# Patient Record
Sex: FEMALE | Race: WHITE | Employment: UNEMPLOYED | URBAN - METROPOLITAN AREA
[De-identification: names, ages, dates, MRNs, and addresses within clinical notes are randomized per-mention and may not be internally consistent; named-entity substitution may affect disease eponyms.]

---

## 2021-09-24 ENCOUNTER — HOSPITAL ENCOUNTER (OUTPATIENT)
Age: 41
Discharge: HOME | End: 2021-09-26
Payer: COMMERCIAL

## 2021-09-24 ENCOUNTER — HOSPITAL ENCOUNTER (OUTPATIENT)
Dept: MAMMOGRAPHY | Age: 41
Discharge: HOME | End: 2021-09-26
Payer: COMMERCIAL

## 2021-09-24 DIAGNOSIS — Z12.31 ENCOUNTER FOR SCREENING MAMMOGRAM FOR MALIGNANT NEOPLASM OF BREAST: ICD-10-CM

## 2021-09-24 PROCEDURE — 77063 BREAST TOMOSYNTHESIS BI: CPT

## 2023-09-23 ENCOUNTER — HOSPITAL ENCOUNTER (INPATIENT)
Age: 43
LOS: 3 days | Discharge: HOME | DRG: 885 | End: 2023-09-26
Attending: PSYCHIATRY & NEUROLOGY | Admitting: PSYCHIATRY & NEUROLOGY
Payer: COMMERCIAL

## 2023-09-23 ENCOUNTER — HOSPITAL ENCOUNTER (EMERGENCY)
Age: 43
Discharge: PSYCHIATRIC OR SUBSTANCE USE FACILITY | End: 2023-09-23
Attending: EMERGENCY MEDICINE
Payer: COMMERCIAL

## 2023-09-23 VITALS
WEIGHT: 203 LBS | SYSTOLIC BLOOD PRESSURE: 142 MMHG | OXYGEN SATURATION: 99 % | RESPIRATION RATE: 15 BRPM | HEART RATE: 78 BPM | HEIGHT: 64 IN | DIASTOLIC BLOOD PRESSURE: 77 MMHG | BODY MASS INDEX: 34.66 KG/M2 | TEMPERATURE: 99 F

## 2023-09-23 DIAGNOSIS — F31.81 MIXED BIPOLAR II DISORDER (HCC): Primary | ICD-10-CM

## 2023-09-23 DIAGNOSIS — R45.851 SUICIDAL IDEATION: ICD-10-CM

## 2023-09-23 DIAGNOSIS — F41.0 ANXIETY ATTACK: Primary | ICD-10-CM

## 2023-09-23 PROBLEM — F32.A DEPRESSION WITH SUICIDAL IDEATION: Status: ACTIVE | Noted: 2023-09-23

## 2023-09-23 LAB
ALBUMIN SERPL-MCNC: 4.6 G/DL (ref 3.5–5.2)
ALP SERPL-CCNC: 64 U/L (ref 35–104)
ALT SERPL-CCNC: 10 U/L (ref 0–32)
AMPHET UR QL SCN: NEGATIVE
ANION GAP SERPL CALCULATED.3IONS-SCNC: 15 MMOL/L (ref 7–16)
APAP SERPL-MCNC: <5 UG/ML (ref 10–30)
AST SERPL-CCNC: 11 U/L (ref 0–31)
BACTERIA URNS QL MICRO: ABNORMAL
BARBITURATES UR QL SCN: NEGATIVE
BASOPHILS # BLD: 0.06 K/UL (ref 0–0.2)
BASOPHILS NFR BLD: 1 % (ref 0–2)
BENZODIAZ UR QL: NEGATIVE
BILIRUB SERPL-MCNC: 2.1 MG/DL (ref 0–1.2)
BILIRUB UR QL STRIP: ABNORMAL
BUN SERPL-MCNC: 19 MG/DL (ref 6–20)
CALCIUM SERPL-MCNC: 9.7 MG/DL (ref 8.6–10.2)
CANNABINOIDS UR QL SCN: POSITIVE
CHLORIDE SERPL-SCNC: 102 MMOL/L (ref 98–107)
CLARITY UR: CLEAR
CO2 SERPL-SCNC: 20 MMOL/L (ref 22–29)
COCAINE UR QL SCN: NEGATIVE
COLOR UR: YELLOW
CREAT SERPL-MCNC: 0.7 MG/DL (ref 0.5–1)
EKG ATRIAL RATE: 63 BPM
EKG P AXIS: 32 DEGREES
EKG P-R INTERVAL: 162 MS
EKG Q-T INTERVAL: 410 MS
EKG QRS DURATION: 76 MS
EKG QTC CALCULATION (BAZETT): 419 MS
EKG R AXIS: -4 DEGREES
EKG T AXIS: 12 DEGREES
EKG VENTRICULAR RATE: 63 BPM
EOSINOPHIL # BLD: 0.07 K/UL (ref 0.05–0.5)
EOSINOPHILS RELATIVE PERCENT: 1 % (ref 0–6)
ERYTHROCYTE [DISTWIDTH] IN BLOOD BY AUTOMATED COUNT: 12.6 % (ref 11.5–15)
ETHANOLAMINE SERPL-MCNC: <10 MG/DL
FENTANYL UR QL: NEGATIVE
GFR SERPL CREATININE-BSD FRML MDRD: >60 ML/MIN/1.73M2
GLUCOSE SERPL-MCNC: 88 MG/DL (ref 74–99)
GLUCOSE UR STRIP-MCNC: NEGATIVE MG/DL
HCG, URINE, POC: NEGATIVE
HCT VFR BLD AUTO: 47.9 % (ref 34–48)
HGB BLD-MCNC: 16.3 G/DL (ref 11.5–15.5)
HGB UR QL STRIP.AUTO: NEGATIVE
IMM GRANULOCYTES # BLD AUTO: 0.06 K/UL (ref 0–0.58)
IMM GRANULOCYTES NFR BLD: 1 % (ref 0–5)
KETONES UR STRIP-MCNC: >80 MG/DL
LEUKOCYTE ESTERASE UR QL STRIP: NEGATIVE
LYMPHOCYTES NFR BLD: 1.19 K/UL (ref 1.5–4)
LYMPHOCYTES RELATIVE PERCENT: 12 % (ref 20–42)
Lab: NORMAL
MCH RBC QN AUTO: 30.7 PG (ref 26–35)
MCHC RBC AUTO-ENTMCNC: 34 G/DL (ref 32–34.5)
MCV RBC AUTO: 90.2 FL (ref 80–99.9)
METHADONE UR QL: NEGATIVE
MONOCYTES NFR BLD: 0.37 K/UL (ref 0.1–0.95)
MONOCYTES NFR BLD: 4 % (ref 2–12)
NEGATIVE QC PASS/FAIL: NORMAL
NEUTROPHILS NFR BLD: 83 % (ref 43–80)
NEUTS SEG NFR BLD: 8.31 K/UL (ref 1.8–7.3)
NITRITE UR QL STRIP: NEGATIVE
OPIATES UR QL SCN: NEGATIVE
OXYCODONE UR QL SCN: NEGATIVE
PCP UR QL SCN: NEGATIVE
PH UR STRIP: 6 [PH] (ref 5–9)
PLATELET # BLD AUTO: 297 K/UL (ref 130–450)
PMV BLD AUTO: 10.3 FL (ref 7–12)
POSITIVE QC PASS/FAIL: NORMAL
POTASSIUM SERPL-SCNC: 3.9 MMOL/L (ref 3.5–5)
PROT SERPL-MCNC: 7.5 G/DL (ref 6.4–8.3)
PROT UR STRIP-MCNC: NEGATIVE MG/DL
RBC # BLD AUTO: 5.31 M/UL (ref 3.5–5.5)
RBC #/AREA URNS HPF: ABNORMAL /HPF
SALICYLATES SERPL-MCNC: <0.3 MG/DL (ref 0–30)
SODIUM SERPL-SCNC: 137 MMOL/L (ref 132–146)
SP GR UR STRIP: >1.03 (ref 1–1.03)
TEST INFORMATION: ABNORMAL
TOXIC TRICYCLIC SC,BLOOD: NEGATIVE
TSH SERPL DL<=0.05 MIU/L-ACNC: 0.67 UIU/ML (ref 0.27–4.2)
UROBILINOGEN UR STRIP-ACNC: 0.2 EU/DL (ref 0–1)
WBC #/AREA URNS HPF: ABNORMAL /HPF
WBC OTHER # BLD: 10.1 K/UL (ref 4.5–11.5)

## 2023-09-23 PROCEDURE — 85025 COMPLETE CBC W/AUTO DIFF WBC: CPT

## 2023-09-23 PROCEDURE — 80143 DRUG ASSAY ACETAMINOPHEN: CPT

## 2023-09-23 PROCEDURE — 80053 COMPREHEN METABOLIC PANEL: CPT

## 2023-09-23 PROCEDURE — G0480 DRUG TEST DEF 1-7 CLASSES: HCPCS

## 2023-09-23 PROCEDURE — 6360000002 HC RX W HCPCS: Performed by: EMERGENCY MEDICINE

## 2023-09-23 PROCEDURE — 6370000000 HC RX 637 (ALT 250 FOR IP): Performed by: PSYCHIATRY & NEUROLOGY

## 2023-09-23 PROCEDURE — 96374 THER/PROPH/DIAG INJ IV PUSH: CPT

## 2023-09-23 PROCEDURE — 84443 ASSAY THYROID STIM HORMONE: CPT

## 2023-09-23 PROCEDURE — 93010 ELECTROCARDIOGRAM REPORT: CPT | Performed by: INTERNAL MEDICINE

## 2023-09-23 PROCEDURE — 99285 EMERGENCY DEPT VISIT HI MDM: CPT

## 2023-09-23 PROCEDURE — 1240000000 HC EMOTIONAL WELLNESS R&B

## 2023-09-23 PROCEDURE — 80307 DRUG TEST PRSMV CHEM ANLYZR: CPT

## 2023-09-23 PROCEDURE — 80179 DRUG ASSAY SALICYLATE: CPT

## 2023-09-23 PROCEDURE — 93005 ELECTROCARDIOGRAM TRACING: CPT | Performed by: EMERGENCY MEDICINE

## 2023-09-23 PROCEDURE — 81001 URINALYSIS AUTO W/SCOPE: CPT

## 2023-09-23 RX ORDER — HALOPERIDOL 5 MG/ML
5 INJECTION INTRAMUSCULAR EVERY 6 HOURS PRN
Status: DISCONTINUED | OUTPATIENT
Start: 2023-09-23 | End: 2023-09-26 | Stop reason: HOSPADM

## 2023-09-23 RX ORDER — HALOPERIDOL 5 MG/1
5 TABLET ORAL EVERY 6 HOURS PRN
Status: DISCONTINUED | OUTPATIENT
Start: 2023-09-23 | End: 2023-09-26 | Stop reason: HOSPADM

## 2023-09-23 RX ORDER — LORAZEPAM 2 MG/ML
0.5 INJECTION INTRAMUSCULAR ONCE
Status: COMPLETED | OUTPATIENT
Start: 2023-09-23 | End: 2023-09-23

## 2023-09-23 RX ORDER — NICOTINE 21 MG/24HR
1 PATCH, TRANSDERMAL 24 HOURS TRANSDERMAL DAILY
Status: DISCONTINUED | OUTPATIENT
Start: 2023-09-24 | End: 2023-09-26 | Stop reason: HOSPADM

## 2023-09-23 RX ORDER — HYDROXYZINE PAMOATE 50 MG/1
50 CAPSULE ORAL 3 TIMES DAILY PRN
Status: DISCONTINUED | OUTPATIENT
Start: 2023-09-23 | End: 2023-09-26 | Stop reason: HOSPADM

## 2023-09-23 RX ORDER — LANOLIN ALCOHOL/MO/W.PET/CERES
3 CREAM (GRAM) TOPICAL NIGHTLY PRN
Status: DISCONTINUED | OUTPATIENT
Start: 2023-09-23 | End: 2023-09-26 | Stop reason: HOSPADM

## 2023-09-23 RX ORDER — MAGNESIUM HYDROXIDE/ALUMINUM HYDROXICE/SIMETHICONE 120; 1200; 1200 MG/30ML; MG/30ML; MG/30ML
30 SUSPENSION ORAL PRN
Status: DISCONTINUED | OUTPATIENT
Start: 2023-09-23 | End: 2023-09-26 | Stop reason: HOSPADM

## 2023-09-23 RX ORDER — ACETAMINOPHEN 325 MG/1
650 TABLET ORAL EVERY 6 HOURS PRN
Status: DISCONTINUED | OUTPATIENT
Start: 2023-09-23 | End: 2023-09-26 | Stop reason: HOSPADM

## 2023-09-23 RX ADMIN — MELATONIN 3 MG ORAL TABLET 3 MG: 3 TABLET ORAL at 23:58

## 2023-09-23 RX ADMIN — LORAZEPAM 0.5 MG: 2 INJECTION INTRAMUSCULAR; INTRAVENOUS at 12:18

## 2023-09-23 RX ADMIN — HYDROXYZINE PAMOATE 50 MG: 50 CAPSULE ORAL at 23:58

## 2023-09-23 NOTE — ED NOTES
Patient crying upon arrival to room. Patient is sobbing and difficult to understand. Spouse at bedside.       Ricardo Carrillo RN  09/23/23 1730

## 2023-09-23 NOTE — ED NOTES
Patient troyevelyn SI/HI at this time. States she feels \"sad\" and has had these feeling all her life. Cooperative with care.      Tomy Baeza, STEPHON  09/23/23 7727

## 2023-09-23 NOTE — ED NOTES
Patient pink slipped by attending resident @ [phone] on 9/23/2023. Pink slip and tele consent faxed to [phone]Frankie Bauer RN  09/23/23 3210

## 2023-09-23 NOTE — ED NOTES
Patient had process explained of medical clearance and psychological evaluation. Patient becoming much calmer and able to carry on a conversation.        Octavio Richardson RN  09/23/23 1924

## 2023-09-24 PROBLEM — F32.A DEPRESSION WITH SUICIDAL IDEATION: Status: RESOLVED | Noted: 2023-09-23 | Resolved: 2023-09-24

## 2023-09-24 PROBLEM — F31.81 MIXED BIPOLAR II DISORDER (HCC): Status: ACTIVE | Noted: 2023-09-24

## 2023-09-24 PROBLEM — R45.851 DEPRESSION WITH SUICIDAL IDEATION: Status: RESOLVED | Noted: 2023-09-23 | Resolved: 2023-09-24

## 2023-09-24 PROCEDURE — 90792 PSYCH DIAG EVAL W/MED SRVCS: CPT | Performed by: NURSE PRACTITIONER

## 2023-09-24 PROCEDURE — 1240000000 HC EMOTIONAL WELLNESS R&B

## 2023-09-24 PROCEDURE — 6370000000 HC RX 637 (ALT 250 FOR IP): Performed by: NURSE PRACTITIONER

## 2023-09-24 PROCEDURE — 6370000000 HC RX 637 (ALT 250 FOR IP): Performed by: PSYCHIATRY & NEUROLOGY

## 2023-09-24 RX ORDER — OLANZAPINE 5 MG/1
5 TABLET ORAL NIGHTLY
Status: DISCONTINUED | OUTPATIENT
Start: 2023-09-24 | End: 2023-09-26 | Stop reason: HOSPADM

## 2023-09-24 RX ORDER — DIVALPROEX SODIUM 250 MG/1
250 TABLET, DELAYED RELEASE ORAL EVERY 12 HOURS SCHEDULED
Status: DISCONTINUED | OUTPATIENT
Start: 2023-09-24 | End: 2023-09-26 | Stop reason: HOSPADM

## 2023-09-24 RX ADMIN — DIVALPROEX SODIUM 250 MG: 250 TABLET, DELAYED RELEASE ORAL at 22:19

## 2023-09-24 RX ADMIN — OLANZAPINE 5 MG: 5 TABLET, FILM COATED ORAL at 22:20

## 2023-09-24 RX ADMIN — HYDROXYZINE PAMOATE 50 MG: 50 CAPSULE ORAL at 22:19

## 2023-09-24 RX ADMIN — MELATONIN 3 MG ORAL TABLET 3 MG: 3 TABLET ORAL at 22:20

## 2023-09-24 ASSESSMENT — PATIENT HEALTH QUESTIONNAIRE - PHQ9
SUM OF ALL RESPONSES TO PHQ QUESTIONS 1-9: 2
2. FEELING DOWN, DEPRESSED OR HOPELESS: 1
SUM OF ALL RESPONSES TO PHQ QUESTIONS 1-9: 2
SUM OF ALL RESPONSES TO PHQ9 QUESTIONS 1 & 2: 2
SUM OF ALL RESPONSES TO PHQ QUESTIONS 1-9: 2
2. FEELING DOWN, DEPRESSED OR HOPELESS: 1
SUM OF ALL RESPONSES TO PHQ9 QUESTIONS 1 & 2: 2
SUM OF ALL RESPONSES TO PHQ QUESTIONS 1-9: 2
1. LITTLE INTEREST OR PLEASURE IN DOING THINGS: 1
1. LITTLE INTEREST OR PLEASURE IN DOING THINGS: 1

## 2023-09-24 ASSESSMENT — SLEEP AND FATIGUE QUESTIONNAIRES
DO YOU HAVE DIFFICULTY SLEEPING: YES
SLEEP PATTERN: DIFFICULTY FALLING ASLEEP
DO YOU USE A SLEEP AID: NO
DO YOU HAVE DIFFICULTY SLEEPING: YES
AVERAGE NUMBER OF SLEEP HOURS: 7
AVERAGE NUMBER OF SLEEP HOURS: 7
SLEEP PATTERN: DIFFICULTY FALLING ASLEEP
DO YOU USE A SLEEP AID: NO

## 2023-09-24 ASSESSMENT — LIFESTYLE VARIABLES
HOW MANY STANDARD DRINKS CONTAINING ALCOHOL DO YOU HAVE ON A TYPICAL DAY: PATIENT DOES NOT DRINK
HOW OFTEN DO YOU HAVE A DRINK CONTAINING ALCOHOL: NEVER
HOW MANY STANDARD DRINKS CONTAINING ALCOHOL DO YOU HAVE ON A TYPICAL DAY: PATIENT DOES NOT DRINK
HOW OFTEN DO YOU HAVE A DRINK CONTAINING ALCOHOL: NEVER

## 2023-09-24 NOTE — GROUP NOTE
Group Therapy Note    Date: 9/24/2023    Group Start Time: 1115  Group End Time: 0738  Group Topic: Psychoeducation    [facility] 2    Alycia Delgado                                                                        Group Therapy Note    Date: 9/24/2023  Start Time: 1115  End Time:  6113  Number of Participants: 9    Type of Group: Psychoeducation    Wellness Binder Information  Module Name:  5 ways to well being    Patient's Goal:  Increase knowledge of different dimensions of wellness. To ID one or two ways to improve well being in one of the domains    Notes:  CTRS discussed five different dimensions of well being and ways to improve well being. Patient accepting of handout and exhibited a receptive behavior to information provided. Status After Intervention:  Improved    Participation Level:  Active Listener and Interactive    Participation Quality: Appropriate, Attentive, and Sharing      Speech:  normal      Thought Process/Content: Logical      Affective Functioning: Congruent      Mood: euthymic      Level of consciousness:  Alert and Attentive      Response to Learning: Able to verbalize current knowledge/experience and Able to verbalize/acknowledge new learning      Endings: None Reported    Modes of Intervention: Education and Exploration      Discipline Responsible: Psychoeducational Specialist      Signature:  Alycia Delgado
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
## 2023-09-24 NOTE — ED NOTES
[facility] #[id number]   [facility]# [phone]     Daren [facility]  09/23/23 205

## 2023-09-24 NOTE — CONSENT
Pt accepted to Dr Salma Orourke service per [facility] RN, Jeananne Nyhan in admitting notified of assigned bed [address] aware of pending admission, Zachary Grace in [place] aware of pt's admission status. PLEASE REMEMBER TO SEND ORIGINAL PINK SLIP. Report can be called to [phone].

## 2023-09-24 NOTE — H&P
Department of Psychiatry  History and Physical - Adult       Patient personally seen and examined by me mental status examination performed. I agree the below assessment by the medical student. Psychomotor evaluation revealed no agitation retardation any abnormal movements. Her eye contact is fair her speech is n rapid pressured she is hyperverbal.  Her mood is \"I am doing okay today. \"  Affect is appropriate pleasant. Thought process is tangential.  Thought contents with devoid of any auditory or visual hallucinations delusions or other perceptual normalities. she denies suicidal homicidal ideations intent or plan. She is able to repeat words and phrases, her vocabulary is intact she has knowledge of current events and past events recent remote memory are intact her impulse control is adequate her cognitive function was to be at her baseline her insight judgment is fair she is alert oriented time place and person              CHIEF COMPLAINT:      \" I had a bad panic attack\"    CIRCUMSTANCES OF ADMISSION:   Patient was pink slipped in the ED for suicidal ideation   HISTORY OF PRESENT ILLNESS:      The patient is a 37 y.o. female,  and with 1 son, with a past psychiatric history of panic attack, with no past psychiatric hospitalization. Patient came to the ED voluntarily but was pink slipped due to suicidal ideation. Urine drug screen was positive for cannabis and QTC was 419 ms. Patient was medically cleared and admitted to [facility] [address] adult psychiatric unit for further psychiatric evaluation and stabilization. Upon evaluation today, patient states that she was having worsening panic attacks where she sought treatment with the PCP when it first started. She tried zoloft for months but had sexual side effects which she does not like. She tried bupropion for which she states it did not work either.  She denies any depressive mood, anhedonia, appetite changes, activity changes, feeling of worthlessness but states

## 2023-09-24 NOTE — PLAN OF CARE
Denies SI/HI   denies hallucinations  out on the unit and social with select peers   affect is bright and smiling  states this her first psych admit because she had a panic attack  attending group  will continue to monitor

## 2023-09-24 NOTE — PROGRESS NOTES
[facility]  Admission Note     Admission Type:   Admission Type: Involuntary    Reason for admission:  Reason for Admission: \"My anxiety has been bad for three days and I was having a panic attack, I couldn't stop crying. \"      Addictive Behavior:   Addictive Behavior  In the Past 3 Months, Have You Felt or Has Someone Told You That You Have a Problem With  : None    Medical Problems:   No past medical history on file. Status EXAM:  Mental Status and Behavioral Exam  Normal: No  Level of Assistance: Independent/Self  Facial Expression: Worried, Sad  Affect: Congruent  Level of Consciousness: Alert  Frequency of Checks: 4 times per hour, close  Mood:Normal: No  Mood: Anxious, Depressed, Sad  Motor Activity:Normal: Yes  Eye Contact: Good  Observed Behavior: Cooperative, Tearful  Sexual Misconduct History: Current - no  Preception: Kramer to person, Kramer to time, Kramer to place, Kramer to situation  Attention:Normal: No  Attention: Distractible  Thought Processes: Tangential  Thought Content:Normal: Yes  Depression Symptoms: Feelings of helplessness, Feelings of worthlessness  Anxiety Symptoms: Generalized  Vero Symptoms: No problems reported or observed.   Hallucinations: None  Delusions: No  Memory:Normal: Yes  Insight and Judgment: No  Insight and Judgment: Poor insight    Tobacco Screening:  Practical Counseling, on admission, agustin X, if applicable and completed (first 3 are required if patient doesn't refuse):            ( ) Recognizing danger situations (included triggers and roadblocks)                    ( ) Coping skills (new ways to manage stress,relaxation techniques, changing routine, distraction)                                                           ( ) Basic information about quitting (benefits of quitting, techniques in how to quit, available resources  ( ) Referral for counseling faxed to [phone]

## 2023-09-24 NOTE — PROGRESS NOTES
Patient attended community meeting   Was updated on expectations of the unit, staffing, and programming  Patient shared goal for today as \"to not be so anxious\"

## 2023-09-24 NOTE — ED NOTES
Collateral Note:  Pt is a 36 yo female who presents to the ED as a walk in accompanied by her spouse, pt is pink slipped by the ED physician. Pt presented reporting anxiety and she has been crying for 3 days. ED doctors note states pt has hx of Zoloft which she stooped taking as she did not like the way it made her feel, also started on Bupropion but reported it did not really address her symptoms. Currently reporting significant stress in her home life caring for an autistic son. Pt reports daily thoughts of harming herself but states she has never done anything about them due to her [employer] beliefs and her children. Per pink slip pt did make statements to ED doc that she doesn't want to be a burden to anyone anymore. Per pink slip pt spouse stated to physician that he is very concerned about pt harming herself. Pt has hx of being prescribed Zoloft and bupropion by her PCP but these were not successful for her. No hx of previous admission to psych unit noted. Pt not open for counseling or psychiatric care in the community. It is noted that tox screen positive for cannabis.         Referred to [facility] nurse Weaver for review with on-call re: possible admission [place], [place]  09/23/23 [address] [place], [place]  09/23/23 2031

## 2023-09-24 NOTE — PROGRESS NOTES
4 Eyes Skin Assessment     NAME:  Katelyn Drew  YOB: 1980  MEDICAL RECORD NUMBER:  [id number]    The patient is being assessed for  Admission    I agree that at least one RN has performed a thorough Head to Toe Skin Assessment on the patient. ALL assessment sites listed below have been assessed. Areas assessed by both nurses:    Head, Face, Ears, Shoulders, Back, Chest, Arms, Elbows, Hands, Sacrum. Buttock, Coccyx, Ischium, and Legs. Feet and Heels        Does the Patient have a Wound?  No noted wound(s)       Rex Prevention initiated by RN: No  Wound Care Orders initiated by RN: No    Pressure Injury (Stage 3,4, Unstageable, DTI, NWPT, and Complex wounds) if present, place Wound referral order by RN under : No    New Ostomies, if present place, Ostomy referral order under : No     Nurse 1 eSignature: Electronically signed by Antionette Mendenhall RN on 9/24/23 at 1:36 AM EDT    **SHARE this note so that the co-signing nurse can place an eSignature**    Nurse 2 eSignature: {Esignature:[id number]}

## 2023-09-24 NOTE — PROGRESS NOTES
Patient attended afternoon recreation activity, watching [employer]  Patient calm and cooperative and social with peers. Patient was 1 of 8 in attendance.

## 2023-09-24 NOTE — CARE COORDINATION
Collateral Contact (KETTY signed)  Name: Jonny Miranda  Relationship:   Number: [phone]     Collateral Information: SW called pt's  Felicia Sánchez to obtain collateral information and discuss discharge planning. SW left a voicemail requesting a call back.                   Access to Weapons per Collateral Contact: [] Reports [] Denies

## 2023-09-24 NOTE — PLAN OF CARE
[facility]  Initial Interdisciplinary Treatment Plan NOTE    Review Date & Time: 9/24/23 1500    Patient was in treatment team    Admission Type:   Admission Type: Involuntary    Reason for admission:  Reason for Admission: \"My anxiety has been bad for three days and I was having a panic attack, I couldn't stop crying. \"      Estimated Length of Stay Update:  3-5 days  Estimated Discharge Date Update: 5-7 days    EDUCATION:   Learner Progress Toward Treatment Goals: Reviewed results and recommendations of this team    Method: Group    Outcome: Verbalized understanding    PATIENT GOALS: \"to be not so anxious\"    PLAN/TREATMENT RECOMMENDATIONS UPDATE:day 3    GOALS UPDATE:   Time frame for Short-Term Goals: 3-5 days    Oscar Sher RN

## 2023-09-24 NOTE — ED NOTES
Patient placed in paper gown and patient's clothing put into bags and put at the nursing station.      Samantha Sepulveda RN  09/23/23 2012

## 2023-09-24 NOTE — ED NOTES
Spoke to Dr. Clemente Zelaya who accepted patient to [address] [place] [address], diagnosis major depression with anxiety.  [address] notified.       Julia Dickey RN  09/23/23 2043

## 2023-09-25 PROCEDURE — 6370000000 HC RX 637 (ALT 250 FOR IP): Performed by: NURSE PRACTITIONER

## 2023-09-25 PROCEDURE — 99232 SBSQ HOSP IP/OBS MODERATE 35: CPT | Performed by: NURSE PRACTITIONER

## 2023-09-25 PROCEDURE — 6370000000 HC RX 637 (ALT 250 FOR IP): Performed by: PSYCHIATRY & NEUROLOGY

## 2023-09-25 PROCEDURE — 1240000000 HC EMOTIONAL WELLNESS R&B

## 2023-09-25 RX ADMIN — DIVALPROEX SODIUM 250 MG: 250 TABLET, DELAYED RELEASE ORAL at 21:20

## 2023-09-25 RX ADMIN — OLANZAPINE 5 MG: 5 TABLET, FILM COATED ORAL at 21:20

## 2023-09-25 RX ADMIN — HYDROXYZINE PAMOATE 50 MG: 50 CAPSULE ORAL at 13:12

## 2023-09-25 RX ADMIN — MELATONIN 3 MG ORAL TABLET 3 MG: 3 TABLET ORAL at 21:20

## 2023-09-25 RX ADMIN — DIVALPROEX SODIUM 250 MG: 250 TABLET, DELAYED RELEASE ORAL at 10:58

## 2023-09-25 ASSESSMENT — PAIN SCALES - GENERAL: PAINLEVEL_OUTOF10: 0

## 2023-09-25 NOTE — PROGRESS NOTES
Patient attended afternoon relaxation activity. Patients participated in activity of patients choice. Patients  we able to color, read and others choose to watch [employer]. Patient was 1 of 9 in attendance.

## 2023-09-25 NOTE — CARE COORDINATION
Collateral Contact (KETTY signed)  Name: Hector Shah  Relationship:   Number: [phone]     Collateral Information: SW called pt's  Ad Pickett to obtain collateral information and discuss discharge planning. SW spoke with Ad Nieves who stated that the pt was crying for 3 days and they were not sure what to do and she needed to be on medications so they brought her to the hospital.  stated that she came into the hospital to get back on medications. Ad Pickett stated that he wants the pt to feel better. Duong Apo stated that the pt has always had anxiety but he is unsure if hormones are making it worse because of menopause. Duong Apo stated that he came for visitation and they have talked on the phone and he has not seen any improvement and he can not accomplish a lot in 15 minutes.  stated that the pt is able to return back home and he will pick her up from the hospital.  stated that the pt does not drive and telehealth would be the best option.  stated that the pt does not have access to any guns/weapons.                  Access to Weapons per Collateral Contact: [] Reports [x] Denies

## 2023-09-25 NOTE — PROGRESS NOTES
Comprehensive Nutrition Assessment    Type and Reason for Visit:  Initial, Positive Nutrition Screen    Nutrition Recommendations/Plan:   Continue current diet regimen ; pt w/ mostly adequate oral intakes this adm- no further nutrition recs at this time  Will sign off- please consult if further recs needed. Malnutrition Assessment:  Malnutrition Status:  Insufficient data (09/25/23 1254)    Context:  Social/Environmental Circumstances     Findings of the 6 clinical characteristics of malnutrition:  Energy Intake:  Mild decrease in energy intake (Comment) (poor intake PTA; intake mostly adequate since admission- difficult to assess longterm intake)  Weight Loss:  Unable to assess (d/t lack of wt hx per EMR)     Body Fat Loss:  Unable to assess (attempted x 2 ; pt not in room)     Muscle Mass Loss:  Unable to assess (attempted x 2 ; pt not in room)    Fluid Accumulation:  No significant fluid accumulation     Strength:  Not Performed    Nutrition Assessment:    pt adm d/t panic attack/anxiety ; pt reports saddness ; tox screen positive for cannabis; No PMhx on file; DINORAH wt hx d/t lack of wt hx recorded per EMR ; pt w/ mostly % oral intakes at meals ; continue current diet regimen; no further nutrition recs at this time; dietitian will sign off at this time- please consult if further nutrition recs needed. Nutrition Related Findings:    DINORAH I/O ; alert ; cracked filling via dentition noted; abd WNL; no edema Wound Type: None       Current Nutrition Intake & Therapies:    Average Meal Intake: % (most)  Average Supplements Intake: Unable to assess  ADULT DIET;  Regular; Safety Tray; Safety Tray (Disposables)  ADULT ORAL NUTRITION SUPPLEMENT; Breakfast, Lunch, Dinner; Standard High Calorie/High Protein Oral Supplement    Anthropometric Measures:  Height: 5' 4\" (162.6 cm)  Ideal Body Weight (IBW): 120 lbs (55 kg)    Admission Body Weight: 191 lb 6.4 oz (86.8 kg) (9/24-SS)  Current Body Weight: 191 lb

## 2023-09-25 NOTE — BH NOTE
Patient resting in bed with eyes closed. Respirations even and unlabored. No signs of distress. Purposeful rounding continued.

## 2023-09-25 NOTE — PLAN OF CARE
Problem: Depression  Goal: Will be euthymic at discharge  Description: INTERVENTIONS:  1. Administer medication as ordered  2. Provide emotional support via 1:1 interaction with staff  3. Encourage involvement in milieu/groups/activities  4. Monitor for social isolation  9/25/2023 0006 by Yasmeen Laureano RN  Outcome: Progressing     Problem: Anxiety  Goal: Will report anxiety at manageable levels  Description: INTERVENTIONS:  1. Administer medication as ordered  2. Teach and rehearse alternative coping skills  3. Provide emotional support with 1:1 interaction with staff  Outcome: Progressing     Problem: Involuntary Admit  Goal: Will cooperate with staff recommendations and doctor's orders and will demonstrate appropriate behavior  Description: INTERVENTIONS:  1. Treat underlying conditions and offer medication as ordered  2. Educate regarding involuntary admission procedures and rules  3.  Contain excessive/inappropriate behavior per unit and hospital policies  Outcome: Progressing Biopsy Method: 15 blade

## 2023-09-25 NOTE — PROGRESS NOTES
Patient attended morning community meeting. Updated on staffing assignments and daily expectations. Shared goal for the day as to use this as a positive experience.

## 2023-09-25 NOTE — GROUP NOTE
Group Therapy Note    Date: 9/25/2023    Group Start Time: 1130  Group End Time: 1753  Group Topic: Psychoeducation    [facility] ACUTE [facility] [address] [place] [place]                                                                        Group Therapy Note    Date: 9/25/2023  Group Name: Boundaries   Patient's Goal:  Patient will be able to identify different types of boundaries and healthy ways to express them. Notes:  Pleasant and sharing in group, able to participate appropriately. Status After Intervention:  Improved    Participation Level:  Active Listener and Interactive    Participation Quality: Appropriate, Attentive, and Sharing      Speech:  normal      Thought Process/Content: Logical      Affective Functioning: Congruent      Mood: euthymic      Level of consciousness:  Alert, Oriented x4, and Attentive      Response to Learning: Able to verbalize/acknowledge new learning      Endings: None Reported    Modes of Intervention: Education, Support, Socialization, Exploration, and Problem-solving      Discipline Responsible: Psychoeducational Specialist      Signature:  Eleanor Castillo

## 2023-09-25 NOTE — PROGRESS NOTES
BEHAVIORAL HEALTH FOLLOW-UP NOTE     9/25/2023     Patient was seen and examined in person, Chart reviewed   Patient's case discussed with staff/team    Chief Complaint: \"I just started the medications yesterday, but I already feel better knowing that I am getting help\"     Interim History:     Patient out in the milieu, states that she is anxious because of the high energy and intensity of the unit. \"Its really loud out here\"  She states that she cannot tell if the medications are working or not because she just started them, but feels better knowing that she is getting help. She denies any SI/HI intent or plan. No overt or covert psychosis or dinora  Optimistic about treatment. Out in the [facility] attending groups. Taking medications. Reports she slept okay   Appetite:   [x] Normal/Unchanged  [] Increased  [] Decreased      Sleep:       [x] Normal/Unchanged  [] Fair       [] Poor              Energy:    [x] Normal/Unchanged  [] Increased  [] Decreased        SI [] Present  [x] Absent    HI  []Present  [x] Absent     Aggression:  [] yes  [x] no    Patient is [x] able  [] unable to CONTRACT FOR SAFETY     PAST MEDICAL/PSYCHIATRIC HISTORY:   No past medical history on file. FAMILY/SOCIAL HISTORY:  No family history on file.   Social History     Socioeconomic History    Marital status:      Spouse name: Not on file    Number of children: Not on file    Years of education: Not on file    Highest education level: Not on file   Occupational History    Not on file   Tobacco Use    Smoking status: Never    Smokeless tobacco: Never   Substance and Sexual Activity    Alcohol use: Not on file    Drug use: Not on file    Sexual activity: Not on file   Other Topics Concern    Not on file   Social History Narrative    Not on file     Social Determinants of Health     Financial Resource Strain: Not on file   Food Insecurity: Not on file   Transportation Needs: Not on file   Physical Activity: Not on file   Stress: Not on

## 2023-09-25 NOTE — GROUP NOTE
Group Therapy Note    Date: 9/25/2023    Group Start Time: 0125  Group End Time: 1122  Group Topic: Cognitive Skills    [facility] AC[facility]    Thankful GIRISH Hernandes LSW        Group Therapy Note    Attendees: 8       Patient's Goal:  Pt attended group therapy where we learned about cognitive distortions and the different types that effect our thinking. Notes:  Pt was an active participant in group therapy. Status After Intervention:  Improved    Participation Level: Active Listener and Interactive    Participation Quality: Appropriate, Attentive, and Sharing      Speech:  normal      Thought Process/Content: Logical      Affective Functioning: Congruent      Mood: euthymic      Level of consciousness:  Alert and Attentive      Response to Learning: Able to verbalize current knowledge/experience and Able to retain information      Endings: None Reported    Modes of Intervention: Education, Support, Socialization, Exploration, Clarifying, and Problem-solving      Discipline Responsible: /Counselor      Signature:   GIRISH Valenzuela, ODELL

## 2023-09-25 NOTE — BH NOTE
Patient denies suicidal ideation, homicidal ideations and AVH. Denies depression. Reports anxiety is 4/10 due to \"being in a new place with new people\". Patient is friendly and cooperative during assessment. Patient appears worried but brightens with conversation. Patient is out on the unit and is social with select. Medications taken without issue. No complaints or concerns verbalized at this time. No unit problems reported. Will continue to observe and support.

## 2023-09-25 NOTE — PLAN OF CARE
Denies SI/HI  denies hallucinations  out on the unit and social with select peers  cooperative with meds  attending group       complaint of some anxiety  will continue to monitor

## 2023-09-26 VITALS
TEMPERATURE: 98.2 F | RESPIRATION RATE: 16 BRPM | SYSTOLIC BLOOD PRESSURE: 127 MMHG | WEIGHT: 191.4 LBS | HEART RATE: 82 BPM | BODY MASS INDEX: 32.68 KG/M2 | OXYGEN SATURATION: 99 % | DIASTOLIC BLOOD PRESSURE: 80 MMHG | HEIGHT: 64 IN

## 2023-09-26 PROCEDURE — 6370000000 HC RX 637 (ALT 250 FOR IP): Performed by: NURSE PRACTITIONER

## 2023-09-26 PROCEDURE — 99239 HOSP IP/OBS DSCHRG MGMT >30: CPT | Performed by: NURSE PRACTITIONER

## 2023-09-26 PROCEDURE — 6370000000 HC RX 637 (ALT 250 FOR IP): Performed by: PSYCHIATRY & NEUROLOGY

## 2023-09-26 RX ORDER — OLANZAPINE 5 MG/1
5 TABLET ORAL NIGHTLY
Qty: 30 TABLET | Refills: 0 | Status: SHIPPED | OUTPATIENT
Start: 2023-09-26 | End: 2023-10-26

## 2023-09-26 RX ORDER — HYDROXYZINE PAMOATE 50 MG/1
50 CAPSULE ORAL 3 TIMES DAILY PRN
Qty: 42 CAPSULE | Refills: 0 | Status: SHIPPED | OUTPATIENT
Start: 2023-09-26 | End: 2023-10-10

## 2023-09-26 RX ORDER — LANOLIN ALCOHOL/MO/W.PET/CERES
3 CREAM (GRAM) TOPICAL NIGHTLY PRN
Refills: 3 | COMMUNITY
Start: 2023-09-26

## 2023-09-26 RX ORDER — DIVALPROEX SODIUM 250 MG/1
250 TABLET, DELAYED RELEASE ORAL EVERY 12 HOURS SCHEDULED
Qty: 60 TABLET | Refills: 0 | Status: SHIPPED | OUTPATIENT
Start: 2023-09-26 | End: 2023-10-26

## 2023-09-26 RX ADMIN — HYDROXYZINE PAMOATE 50 MG: 50 CAPSULE ORAL at 09:47

## 2023-09-26 RX ADMIN — DIVALPROEX SODIUM 250 MG: 250 TABLET, DELAYED RELEASE ORAL at 08:37

## 2023-09-26 ASSESSMENT — PAIN SCALES - GENERAL
PAINLEVEL_OUTOF10: 0
PAINLEVEL_OUTOF10: 0

## 2023-09-26 NOTE — DISCHARGE INSTRUCTIONS
Counseling   [address] [facility], [facility], [facility]   Phone: [phone]   Fax: [phone]     [facility]   [address], Ritesh Roberson   Phone: [phone]   Fax: [phone] 8585 Jessica Robb [address] [place] [address]   Phone: [phone]   fax: [address]   [address]   Phone: [phone]   Fax: [phone]     [address]   Phone: [phone]   Fax: [address]   [address] [facility] [address]   Phone: [phone]   Fax: [phone]     [facility]   [address]   Phone: [phone]   Fax: [phone]     General acute hospital   [address] [address]   Phone: [phone]   Fax: [address] [place] [address], [address] [place] [address] Drive   Phone: [phone]   Fax: [phone]     [facility]   [facility] [address]   Phone: [phone]   Fax number: [phone]     [facility]   [address] [facility], ALEX SOLIZ [facility], [address] [place] [address]   Phone: [phone]   Fax:  [facility]   [address] [place] [address], ALEX SOLIZ [facility], [address] [place] [address]   Phone: [phone]   Fax: [address], ALEX SOLIZ [facility], [address] [place] [address]   Phone: [phone]   Fax: [phone]     [facility] Claude Martin, ALEX [facility], [address] [place] [address]   Phone: [phone]   Fax: [phone]     [facility]   [address] [place] [address] [address], ALEX SOLIZ [facility], [address] [place] [address]   Phone:[phone]   Fax: [phone]     [facility]   Rizwan JOHNSON [facility], [address] [place] [address]   Phone: [phone]   Fax: [phone],

## 2023-09-26 NOTE — PLAN OF CARE
Problem: Depression  Goal: Will be euthymic at discharge  Description: INTERVENTIONS:  1. Administer medication as ordered  2. Provide emotional support via 1:1 interaction with staff  3. Encourage involvement in milieu/groups/activities  4. Monitor for social isolation  9/26/2023 0900 by Khushbu Pepe RN  Outcome: Progressing  9/25/2023 2131 by Levie Canavan, RN  Outcome: Progressing     Problem: Behavior  Goal: Pt/Family maintain appropriate behavior and adhere to behavioral management agreement, if implemented  Description: INTERVENTIONS:  1. Assess patient/family's coping skills and  non-compliant behavior (including use of illegal substances)  2. Notify security of behavior or suspected illegal substances which indicate the need for search of the family and/or belongings  3. Encourage verbalization of thoughts and concerns in a socially appropriate manner  4. Utilize positive, consistent limit setting strategies supporting safety of patient, staff and others  5. Encourage participation in the decision making process about the behavioral management agreement  6. If a visitor's behavior poses a threat to safety call refer to organization policy. 7. Initiate consult with , Psychosocial CNS, Spiritual Care as appropriate  Outcome: Progressing     Problem: Anxiety  Goal: Will report anxiety at manageable levels  Description: INTERVENTIONS:  1. Administer medication as ordered  2. Teach and rehearse alternative coping skills  3.  Provide emotional support with 1:1 interaction with staff  9/26/2023 0900 by Khushbu Pepe RN  Outcome: Progressing  9/25/2023 2131 by Levie Canavan, RN  Outcome: Progressing

## 2023-09-26 NOTE — BH NOTE
951 [facility]  Discharge Note    Pt discharged with followings belongings:   Dental Appliances: None  Vision - Corrective Lenses: None  Hearing Aid: None  Jewelry: None  Body Piercings Removed: N/A  Clothing: Footwear, Pants, Shirt, Socks, Undergarments (1 bra, 1pant, 1 danilo, 1pair of socks)  Other Valuables: Other (Comment) (n/a)   Valuables sent home with yes or returned to patient. Patient educated on aftercare instructions: yes at 11:53 AM .Patient verbalize understanding of AVS:  yes.     Status EXAM upon discharge:  Mental Status and Behavioral Exam  Normal: No  Level of Assistance: Independent/Self  Facial Expression: Sad  Affect: Congruent  Level of Consciousness: Alert  Frequency of Checks: 4 times per hour, close  Mood:Normal: No  Mood: Anxious  Motor Activity:Normal: Yes  Eye Contact: Good  Observed Behavior: Cooperative, Friendly  Sexual Misconduct History: Current - no  Preception: [place] to person, [place] to time, [place] to place, [place] to situation  Attention:Normal: No  Attention: Distractible  Thought Processes: Circumstantial  Thought Content:Normal: Yes  Thought Content:  (Impaired)  Depression Symptoms: Impaired concentration  Anxiety Symptoms: Generalized  Vero Symptoms: Poor judgment  Hallucinations: None  Delusions: No  Memory:Normal: Yes  Insight and Judgment: No  Insight and Judgment: Poor judgment, Poor insight    Tobacco Screening:  Practical Counseling, on admission, agustin X, if applicable and completed (first 3 are required if patient doesn't refuse)yes:            ( ) Recognizing danger situations (included triggers and roadblocks)                    ( ) Coping skills (new ways to manage stress,relaxation techniques, changing routine, distraction)                                                           ( ) Basic information about quitting (benefits of quitting, techniques in how to quit, available resources  ( ) Referral for counseling faxed to [address] [facility]

## 2023-09-26 NOTE — CARE COORDINATION
SW met with pt to discuss discharge for today. Pt reported that she will be discharging to back home with her  and son and her  will be picking her up from the hospital. Pt stated that she is looking forward to spending time with her son and her family and getting back into a normal routine. Pt reported to feeling good today. Pt is alert and oriented x4. Pt's mood is bright and happy, affect is congruent. Pt's speech is clear, rate and volume is normal. Pt's insight and judgement is improved. Pt denied depression and anxiety. Pt denied SI, HI, AVH. SW called pt's  Harriett Dominguez [phone] to discuss discharge planning. SW left a voicemail requesting a call back. 11:20 AM- SW called pt's  Harriett Dominguez to discuss discharge plan for today. There was no answer, SW left additional message on voicemail. SW called Alexandrea Tam [phone] and was informed that they are unable to schedule the pt with medication management until 10/31. SW called [facility] [phone] and was informed that they not not offering counseling services. SW called [facility] [phone] and was informed that they are not scheduling for medication management until 11/1. SW called [facility] [phone] and was informed that they are not currently doing telehealth but they are able to get the pt in for an intake on Thursday 9/28 at 9 AM and get pt set up with counseling and medication management services. Pt has follow up appointments scheduled at [facility]. Pt has an intake appointment on 9/28.     In order to ensure appropriate transition and discharge planning is in place, the following documents have been transmitted to [facility], as the new outpatient provider:    The d/c diagnosis was transmitted to the next care provider  The reason for hospitalization was transmitted to the next care provider  The d/c medications (dosage and indication) were transmitted to the next care provider   The continuing care plan was

## 2023-09-26 NOTE — PLAN OF CARE
Problem: Depression  Goal: Will be euthymic at discharge  Description: INTERVENTIONS:  1. Administer medication as ordered  2. Provide emotional support via 1:1 interaction with staff  3. Encourage involvement in milieu/groups/activities  4. Monitor for social isolation  Outcome: Progressing     Problem: Anxiety  Goal: Will report anxiety at manageable levels  Description: INTERVENTIONS:  1. Administer medication as ordered  2. Teach and rehearse alternative coping skills  3. Provide emotional support with 1:1 interaction with staff  Outcome: Progressing     Patient has been out on the unit. Social with peers. Cooperative and friendly. Patient was hyperverbal during assessment and thoughts were circumstantial. She rates her anxiety 5/10 d/t home stressors. Patient denies depression, stating \"I only get depressed after a panic attack\". She has c/o poor sleep-prn melatonin given per request. Patient denies suicidal/homicidal ideations and hallucinations. Took HS medications without difficulty. Purposeful rounding continued.

## 2023-09-26 NOTE — PROGRESS NOTES
Patient attended community meeting   Was updated on expectations of the unit, staffing, and programming  Patient shared goal for today as \"stay positive and try to control my emotions\"